# Patient Record
Sex: MALE | Race: OTHER | HISPANIC OR LATINO
[De-identification: names, ages, dates, MRNs, and addresses within clinical notes are randomized per-mention and may not be internally consistent; named-entity substitution may affect disease eponyms.]

---

## 2023-10-24 VITALS
OXYGEN SATURATION: 98 % | SYSTOLIC BLOOD PRESSURE: 144 MMHG | HEART RATE: 48 BPM | TEMPERATURE: 97 F | RESPIRATION RATE: 18 BRPM | DIASTOLIC BLOOD PRESSURE: 65 MMHG

## 2023-10-24 RX ORDER — CHLORHEXIDINE GLUCONATE 213 G/1000ML
1 SOLUTION TOPICAL ONCE
Refills: 0 | Status: DISCONTINUED | OUTPATIENT
Start: 2023-10-26 | End: 2023-10-27

## 2023-10-24 NOTE — H&P ADULT - NSICDXFAMILYHX_GEN_ALL_CORE_FT
FAMILY HISTORY:  Mother  Still living? Unknown  FH: CAD (coronary artery disease), Age at diagnosis: Age Unknown  FH: HTN (hypertension), Age at diagnosis: Age Unknown

## 2023-10-24 NOTE — H&P ADULT - HISTORY OF PRESENT ILLNESS
INCOMPLETE    Cardiologist: Dr. Olvera  Pharmacy:   Escort:     62y/o Greek speaking male, current smoker (started at 14 years old; 2 cigs/ day), FHx CAD/HTN, PMHx HTN, HLD, Prediabetes, Venous Insufficiency, Carotid stenosis, Renal Artery Stenosis, Hx "below neck mass" (s/p surgical removal 2020),  who presents to Dr. Olvera cardiologist reporting occasional Chest Pain at rest (left-sided, "burning," non-radiating) and PROCTOR on 2-3 flights of stairs, associated with easy fatigue, brief palpitations, occasional dizziness, as well as LLE swelling, claudication, and numbness.  Pt underwent TTE (9/29/23) which revealed LVEF 60-65%, normal wall motion, mild LV DD, mild AV sclerosis, trace CO. Pt underwent 10/10/23 Exercise Stress Test which was positive for ischemia with EKG changes of: 1.5mm horizontal ST depressions in II, III, AVF, V4, V5, V6. Per MD note, Holter monitor showed underlying sinus rhythm w/ rare ventricular and supraventricular ectopy. Per MD note, CT Chest/abd/pelvis showed Moderate multivessel coronary calcification.  Pt _______  diaphoresis, orthopnea/PND, cough, LOC, fall, syncope, N/V/D, fever/chills/sick contact, rash, hematuria, BRBPR, melena/hematochezia, PMHx bleeding or clotting disorder.     Per MD note, Carotid duplex showed b/l ICA stenosis (Rt ICA <50%, Lt ICA 50-79% stenosis).  Per MD note, B/L LE Arterial duplex showed no DVTs b/l LE but B/L great saphenous venous incompetence from proximal to distal thigh; PVR normal b/l.    In light of patient's risk factors, abnormal Exercise Stress Test results, and CCS class III-IV anginal/anginal-equivalent symptoms, patient is referred to [facility] for cardiac catheterization w/ possible intervention if clinically indicated.                           Cardiologist: Dr. Olvera  Pharmacy:  [facility] [address]   Escort; Daughter     62y/o Citizen of [place] speaking male, current smoker (started at 14 years old; 2 cigs/ day), FHx CAD/HTN, PMHx HTN, HLD, Prediabetes, Venous Insufficiency, Carotid stenosis, Renal Artery Stenosis, Hx "below neck mass" (s/p surgical removal 2020),  who presents to Dr. Olvera cardiologist reporting occasional Chest Pain at rest (left-sided, "burning," non-radiating) and PROCTOR on 2-3 flights of stairs, associated with easy fatigue, brief palpitations, occasional dizziness, as well as LLE swelling, claudication, and numbness.  Pt underwent TTE (9/29/23) which revealed LVEF 60-65%, normal wall motion, mild LV DD, mild AV sclerosis, trace VT. Pt underwent 10/10/23 Exercise Stress Test which was positive for ischemia with EKG changes of: 1.5mm horizontal ST depressions in II, III, AVF, V4, V5, V6. Per MD note, Holter monitor showed underlying sinus rhythm w/ rare ventricular and supraventricular ectopy. Per MD note, CT Chest/abd/pelvis showed Moderate multivessel coronary calcification.  Pt denies diaphoresis, orthopnea/PND, cough, LOC, fall, syncope, N/V/D, fever/chills/sick contact, rash, hematuria, BRBPR, melena/hematochezia, PMHx bleeding or clotting disorder.     Per MD note, Carotid duplex showed b/l ICA stenosis (Rt ICA <50%, Lt ICA 50-79% stenosis).  Per MD note, B/L LE Arterial duplex showed no DVTs b/l LE but B/L great saphenous venous incompetence from proximal to distal thigh; PVR normal b/l.    In light of patient's risk factors, abnormal Exercise Stress Test results, and CCS class III-IV anginal/anginal-equivalent symptoms, patient is referred to [facility] for cardiac catheterization w/ possible intervention if clinically indicated.                         Cardiologist: Dr. Olvera  Pharmacy:  [facility] [address]   Escort; Daughter     62y/o Bulgarian speaking male, current smoker (started at 14 years old; 2 cigs/ day), FHx CAD/HTN, PMHx HTN, HLD, Prediabetes, Venous Insufficiency, Carotid stenosis, Renal Artery Stenosis, Hx "below neck mass" (s/p surgical removal 2020),  who presents to Dr. Olvera cardiologist reporting occasional Chest Pain at rest (left-sided, "burning," non-radiating) and PROCTOR on 2-3 flights of stairs, associated with easy fatigue, brief palpitations, occasional dizziness, as well as LLE swelling, claudication, and numbness.  Pt underwent TTE (9/29/23) which revealed LVEF 60-65%, normal wall motion, mild LV DD, mild AV sclerosis, trace WI. Pt underwent 10/10/23 Exercise Stress Test which was positive for ischemia with EKG changes of: 1.5mm horizontal ST depressions in II, III, AVF, V4, V5, V6. Per MD note, Holter monitor showed underlying sinus rhythm w/ rare ventricular and supraventricular ectopy. Per MD note, CT Chest/abd/pelvis showed Moderate multivessel coronary calcification.  Pt denies diaphoresis, orthopnea/PND, cough, LOC, fall, syncope, N/V/D, fever/chills/sick contact, rash, hematuria, BRBPR, melena/hematochezia, PMHx bleeding or clotting disorder.     Per MD note, Carotid duplex showed b/l ICA stenosis (Rt ICA <50%, Lt ICA 50-79% stenosis).  Per MD note, B/L LE Arterial duplex showed no DVTs b/l LE but B/L great saphenous venous incompetence from proximal to distal thigh; PVR normal b/l.    In light of patient's risk factors, abnormal Exercise Stress Test results, and CCS class III-IV anginal/anginal-equivalent symptoms, patient is referred to [facility] for cardiac catheterization w/ possible intervention if clinically indicated.

## 2023-10-24 NOTE — H&P ADULT - NSHPLABSRESULTS_GEN_ALL_CORE
13.4   5.00  )-----------( 208      ( 26 Oct 2023 07:51 )             38.5     10-26    141  |  103  |  13  ----------------------------<  126<H>  4.7   |  30  |  1.08    Ca    9.0      26 Oct 2023 07:51  Mg     2.1     10-26    TPro  7.0  /  Alb  4.2  /  TBili  0.2  /  DBili  x   /  AST  19  /  ALT  23  /  AlkPhos  96  10-26

## 2023-10-24 NOTE — H&P ADULT - NSICDXPASTMEDICALHX_GEN_ALL_CORE_FT
PAST MEDICAL HISTORY:  Carotid artery stenosis     History of renal artery stenosis     HLD (hyperlipidemia)     HTN (hypertension)     Pre-diabetes     Smoker     Venous insufficiency

## 2023-10-24 NOTE — H&P ADULT - ASSESSMENT
62y/o [place] Islander speaking male, current smoker (started at 14 years old; 2 cigs/ day), FHx CAD/HTN, PMHx HTN, HLD, Prediabetes, Venous Insufficiency, Carotid stenosis, Renal Artery Stenosis, Hx "below neck mass" (s/p surgical removal 2020),  who presents to Dr. Olvera cardiologist reporting occasional Chest Pain at rest (left-sided, "burning," non-radiating) and PROCTOR on 2-3 flights of stairs, associated with easy fatigue, brief palpitations, occasional dizziness, as well as LLE swelling, claudication, and numbness.  Pt underwent TTE (9/29/23) which revealed LVEF 60-65%, normal wall motion, mild LV DD, mild AV sclerosis, trace MN. Pt underwent 10/10/23 Exercise Stress Test which was positive for ischemia with EKG changes of: 1.5mm horizontal ST depressions in II, III, AVF, V4, V5, V6. Per MD note, Holter monitor showed underlying sinus rhythm w/ rare ventricular and supraventricular ectopy. Per MD note, CT Chest/abd/pelvis showed Moderate multivessel coronary calcification.  Pt denies diaphoresis, orthopnea/PND, cough, LOC, fall, syncope, N/V/D, fever/chills/sick contact, rash, hematuria, BRBPR, melena/hematochezia, PMHx bleeding or clotting disorder.     In light of patient's risk factors, abnormal Exercise Stress Test results, and CCS class III-IV anginal/anginal-equivalent symptoms, patient is referred to [facility] for cardiac catheterization w/ possible intervention if clinically indicated.           Consent obtained via [place] ID [id number]  [place] 2  ASA 2  Pt H+H and platelets WNL, Pt without any reports of BRBPR, hematuria, prior ICH, melena and no bleeding risks. Pt takes home 81mg ASA reports compliance, and 600mg Plavix this morning   Pt Cr. 1. 08 and pre cath fluids ordered per protocol 250ml IV bolus over 30min, and maintenance 75 ml for 2 hours, EF -Normal 60-65  Pt is a Candidate for Moderate Sedation       Risks & benefits of procedure and alternative therapy have been explained to the patient including but not limited to: allergic reaction, bleeding w/possible need for blood transfusion, infection, renal and vascular compromise, limb damage, arrhythmia, stroke, vessel dissection/perforation, Myocardial infarction, emergent CABG. Informed consent obtained and in chart.       Suitable for moderate sedation.

## 2023-10-25 ENCOUNTER — APPOINTMENT (OUTPATIENT)
Dept: CT IMAGING | Facility: HOSPITAL | Age: 63
End: 2023-10-25

## 2023-10-26 ENCOUNTER — TRANSCRIPTION ENCOUNTER (OUTPATIENT)
Age: 63
End: 2023-10-26

## 2023-10-26 ENCOUNTER — INPATIENT (INPATIENT)
Facility: HOSPITAL | Age: 63
LOS: 0 days | Discharge: HOME | DRG: 322 | End: 2023-10-27
Attending: INTERNAL MEDICINE | Admitting: INTERNAL MEDICINE
Payer: MEDICAID

## 2023-10-26 DIAGNOSIS — Q24.5 MALFORMATION OF CORONARY VESSELS: ICD-10-CM

## 2023-10-26 LAB
A1C WITH ESTIMATED AVERAGE GLUCOSE RESULT: 5.7 % — HIGH (ref 4–5.6)
A1C WITH ESTIMATED AVERAGE GLUCOSE RESULT: 5.7 % — HIGH (ref 4–5.6)
ALBUMIN SERPL ELPH-MCNC: 4.2 G/DL — SIGNIFICANT CHANGE UP (ref 3.3–5)
ALBUMIN SERPL ELPH-MCNC: 4.2 G/DL — SIGNIFICANT CHANGE UP (ref 3.3–5)
ALP SERPL-CCNC: 96 U/L — SIGNIFICANT CHANGE UP (ref 40–120)
ALP SERPL-CCNC: 96 U/L — SIGNIFICANT CHANGE UP (ref 40–120)
ALT FLD-CCNC: 23 U/L — SIGNIFICANT CHANGE UP (ref 10–45)
ALT FLD-CCNC: 23 U/L — SIGNIFICANT CHANGE UP (ref 10–45)
ANION GAP SERPL CALC-SCNC: 8 MMOL/L — SIGNIFICANT CHANGE UP (ref 5–17)
ANION GAP SERPL CALC-SCNC: 8 MMOL/L — SIGNIFICANT CHANGE UP (ref 5–17)
APTT BLD: 36.8 SEC — HIGH (ref 24.5–35.6)
APTT BLD: 36.8 SEC — HIGH (ref 24.5–35.6)
AST SERPL-CCNC: 19 U/L — SIGNIFICANT CHANGE UP (ref 10–40)
AST SERPL-CCNC: 19 U/L — SIGNIFICANT CHANGE UP (ref 10–40)
BASOPHILS # BLD AUTO: 0.06 K/UL — SIGNIFICANT CHANGE UP (ref 0–0.2)
BASOPHILS # BLD AUTO: 0.06 K/UL — SIGNIFICANT CHANGE UP (ref 0–0.2)
BASOPHILS NFR BLD AUTO: 1.2 % — SIGNIFICANT CHANGE UP (ref 0–2)
BASOPHILS NFR BLD AUTO: 1.2 % — SIGNIFICANT CHANGE UP (ref 0–2)
BILIRUB SERPL-MCNC: 0.2 MG/DL — SIGNIFICANT CHANGE UP (ref 0.2–1.2)
BILIRUB SERPL-MCNC: 0.2 MG/DL — SIGNIFICANT CHANGE UP (ref 0.2–1.2)
BUN SERPL-MCNC: 13 MG/DL — SIGNIFICANT CHANGE UP (ref 7–23)
BUN SERPL-MCNC: 13 MG/DL — SIGNIFICANT CHANGE UP (ref 7–23)
CALCIUM SERPL-MCNC: 9 MG/DL — SIGNIFICANT CHANGE UP (ref 8.4–10.5)
CALCIUM SERPL-MCNC: 9 MG/DL — SIGNIFICANT CHANGE UP (ref 8.4–10.5)
CHLORIDE SERPL-SCNC: 103 MMOL/L — SIGNIFICANT CHANGE UP (ref 96–108)
CHLORIDE SERPL-SCNC: 103 MMOL/L — SIGNIFICANT CHANGE UP (ref 96–108)
CHOLEST SERPL-MCNC: 166 MG/DL — SIGNIFICANT CHANGE UP
CHOLEST SERPL-MCNC: 166 MG/DL — SIGNIFICANT CHANGE UP
CK MB CFR SERPL CALC: 2.5 NG/ML — SIGNIFICANT CHANGE UP (ref 0–6.7)
CK MB CFR SERPL CALC: 2.5 NG/ML — SIGNIFICANT CHANGE UP (ref 0–6.7)
CK SERPL-CCNC: 123 U/L — SIGNIFICANT CHANGE UP (ref 30–200)
CK SERPL-CCNC: 123 U/L — SIGNIFICANT CHANGE UP (ref 30–200)
CO2 SERPL-SCNC: 30 MMOL/L — SIGNIFICANT CHANGE UP (ref 22–31)
CO2 SERPL-SCNC: 30 MMOL/L — SIGNIFICANT CHANGE UP (ref 22–31)
CREAT SERPL-MCNC: 1.08 MG/DL — SIGNIFICANT CHANGE UP (ref 0.5–1.3)
CREAT SERPL-MCNC: 1.08 MG/DL — SIGNIFICANT CHANGE UP (ref 0.5–1.3)
EGFR: 78 ML/MIN/1.73M2 — SIGNIFICANT CHANGE UP
EGFR: 78 ML/MIN/1.73M2 — SIGNIFICANT CHANGE UP
EOSINOPHIL # BLD AUTO: 0.12 K/UL — SIGNIFICANT CHANGE UP (ref 0–0.5)
EOSINOPHIL # BLD AUTO: 0.12 K/UL — SIGNIFICANT CHANGE UP (ref 0–0.5)
EOSINOPHIL NFR BLD AUTO: 2.4 % — SIGNIFICANT CHANGE UP (ref 0–6)
EOSINOPHIL NFR BLD AUTO: 2.4 % — SIGNIFICANT CHANGE UP (ref 0–6)
ESTIMATED AVERAGE GLUCOSE: 117 MG/DL — HIGH (ref 68–114)
ESTIMATED AVERAGE GLUCOSE: 117 MG/DL — HIGH (ref 68–114)
GLUCOSE SERPL-MCNC: 126 MG/DL — HIGH (ref 70–99)
GLUCOSE SERPL-MCNC: 126 MG/DL — HIGH (ref 70–99)
HCT VFR BLD CALC: 38.5 % — LOW (ref 39–50)
HCT VFR BLD CALC: 38.5 % — LOW (ref 39–50)
HDLC SERPL-MCNC: 40 MG/DL — LOW
HDLC SERPL-MCNC: 40 MG/DL — LOW
HGB BLD-MCNC: 13.4 G/DL — SIGNIFICANT CHANGE UP (ref 13–17)
HGB BLD-MCNC: 13.4 G/DL — SIGNIFICANT CHANGE UP (ref 13–17)
IMM GRANULOCYTES NFR BLD AUTO: 0.2 % — SIGNIFICANT CHANGE UP (ref 0–0.9)
IMM GRANULOCYTES NFR BLD AUTO: 0.2 % — SIGNIFICANT CHANGE UP (ref 0–0.9)
INR BLD: 0.98 — SIGNIFICANT CHANGE UP (ref 0.85–1.18)
INR BLD: 0.98 — SIGNIFICANT CHANGE UP (ref 0.85–1.18)
LIPID PNL WITH DIRECT LDL SERPL: 108 MG/DL — HIGH
LIPID PNL WITH DIRECT LDL SERPL: 108 MG/DL — HIGH
LYMPHOCYTES # BLD AUTO: 1.99 K/UL — SIGNIFICANT CHANGE UP (ref 1–3.3)
LYMPHOCYTES # BLD AUTO: 1.99 K/UL — SIGNIFICANT CHANGE UP (ref 1–3.3)
LYMPHOCYTES # BLD AUTO: 39.8 % — SIGNIFICANT CHANGE UP (ref 13–44)
LYMPHOCYTES # BLD AUTO: 39.8 % — SIGNIFICANT CHANGE UP (ref 13–44)
MAGNESIUM SERPL-MCNC: 2.1 MG/DL — SIGNIFICANT CHANGE UP (ref 1.6–2.6)
MAGNESIUM SERPL-MCNC: 2.1 MG/DL — SIGNIFICANT CHANGE UP (ref 1.6–2.6)
MCHC RBC-ENTMCNC: 32.4 PG — SIGNIFICANT CHANGE UP (ref 27–34)
MCHC RBC-ENTMCNC: 32.4 PG — SIGNIFICANT CHANGE UP (ref 27–34)
MCHC RBC-ENTMCNC: 34.8 GM/DL — SIGNIFICANT CHANGE UP (ref 32–36)
MCHC RBC-ENTMCNC: 34.8 GM/DL — SIGNIFICANT CHANGE UP (ref 32–36)
MCV RBC AUTO: 93 FL — SIGNIFICANT CHANGE UP (ref 80–100)
MCV RBC AUTO: 93 FL — SIGNIFICANT CHANGE UP (ref 80–100)
MONOCYTES # BLD AUTO: 0.47 K/UL — SIGNIFICANT CHANGE UP (ref 0–0.9)
MONOCYTES # BLD AUTO: 0.47 K/UL — SIGNIFICANT CHANGE UP (ref 0–0.9)
MONOCYTES NFR BLD AUTO: 9.4 % — SIGNIFICANT CHANGE UP (ref 2–14)
MONOCYTES NFR BLD AUTO: 9.4 % — SIGNIFICANT CHANGE UP (ref 2–14)
NEUTROPHILS # BLD AUTO: 2.35 K/UL — SIGNIFICANT CHANGE UP (ref 1.8–7.4)
NEUTROPHILS # BLD AUTO: 2.35 K/UL — SIGNIFICANT CHANGE UP (ref 1.8–7.4)
NEUTROPHILS NFR BLD AUTO: 47 % — SIGNIFICANT CHANGE UP (ref 43–77)
NEUTROPHILS NFR BLD AUTO: 47 % — SIGNIFICANT CHANGE UP (ref 43–77)
NON HDL CHOLESTEROL: 126 MG/DL — SIGNIFICANT CHANGE UP
NON HDL CHOLESTEROL: 126 MG/DL — SIGNIFICANT CHANGE UP
NRBC # BLD: 0 /100 WBCS — SIGNIFICANT CHANGE UP (ref 0–0)
NRBC # BLD: 0 /100 WBCS — SIGNIFICANT CHANGE UP (ref 0–0)
PLATELET # BLD AUTO: 208 K/UL — SIGNIFICANT CHANGE UP (ref 150–400)
PLATELET # BLD AUTO: 208 K/UL — SIGNIFICANT CHANGE UP (ref 150–400)
POTASSIUM SERPL-MCNC: 4.7 MMOL/L — SIGNIFICANT CHANGE UP (ref 3.5–5.3)
POTASSIUM SERPL-MCNC: 4.7 MMOL/L — SIGNIFICANT CHANGE UP (ref 3.5–5.3)
POTASSIUM SERPL-SCNC: 4.7 MMOL/L — SIGNIFICANT CHANGE UP (ref 3.5–5.3)
POTASSIUM SERPL-SCNC: 4.7 MMOL/L — SIGNIFICANT CHANGE UP (ref 3.5–5.3)
PROT SERPL-MCNC: 7 G/DL — SIGNIFICANT CHANGE UP (ref 6–8.3)
PROT SERPL-MCNC: 7 G/DL — SIGNIFICANT CHANGE UP (ref 6–8.3)
PROTHROM AB SERPL-ACNC: 11.2 SEC — SIGNIFICANT CHANGE UP (ref 9.5–13)
PROTHROM AB SERPL-ACNC: 11.2 SEC — SIGNIFICANT CHANGE UP (ref 9.5–13)
RBC # BLD: 4.14 M/UL — LOW (ref 4.2–5.8)
RBC # BLD: 4.14 M/UL — LOW (ref 4.2–5.8)
RBC # FLD: 12 % — SIGNIFICANT CHANGE UP (ref 10.3–14.5)
RBC # FLD: 12 % — SIGNIFICANT CHANGE UP (ref 10.3–14.5)
SODIUM SERPL-SCNC: 141 MMOL/L — SIGNIFICANT CHANGE UP (ref 135–145)
SODIUM SERPL-SCNC: 141 MMOL/L — SIGNIFICANT CHANGE UP (ref 135–145)
TRIGL SERPL-MCNC: 89 MG/DL — SIGNIFICANT CHANGE UP
TRIGL SERPL-MCNC: 89 MG/DL — SIGNIFICANT CHANGE UP
WBC # BLD: 5 K/UL — SIGNIFICANT CHANGE UP (ref 3.8–10.5)
WBC # BLD: 5 K/UL — SIGNIFICANT CHANGE UP (ref 3.8–10.5)
WBC # FLD AUTO: 5 K/UL — SIGNIFICANT CHANGE UP (ref 3.8–10.5)
WBC # FLD AUTO: 5 K/UL — SIGNIFICANT CHANGE UP (ref 3.8–10.5)

## 2023-10-26 PROCEDURE — 92928 PRQ TCAT PLMT NTRAC ST 1 LES: CPT | Mod: RC

## 2023-10-26 PROCEDURE — 93458 L HRT ARTERY/VENTRICLE ANGIO: CPT | Mod: 26,59

## 2023-10-26 PROCEDURE — 99152 MOD SED SAME PHYS/QHP 5/>YRS: CPT

## 2023-10-26 RX ORDER — ASPIRIN/CALCIUM CARB/MAGNESIUM 324 MG
81 TABLET ORAL DAILY
Refills: 0 | Status: DISCONTINUED | OUTPATIENT
Start: 2023-10-27 | End: 2023-10-27

## 2023-10-26 RX ORDER — VALSARTAN 80 MG/1
1 TABLET ORAL
Refills: 0 | DISCHARGE

## 2023-10-26 RX ORDER — ASPIRIN/CALCIUM CARB/MAGNESIUM 324 MG
81 TABLET ORAL ONCE
Refills: 0 | Status: DISCONTINUED | OUTPATIENT
Start: 2023-10-26 | End: 2023-10-26

## 2023-10-26 RX ORDER — SODIUM CHLORIDE 9 MG/ML
250 INJECTION INTRAMUSCULAR; INTRAVENOUS; SUBCUTANEOUS ONCE
Refills: 0 | Status: COMPLETED | OUTPATIENT
Start: 2023-10-26 | End: 2023-10-26

## 2023-10-26 RX ORDER — CLOPIDOGREL BISULFATE 75 MG/1
75 TABLET, FILM COATED ORAL DAILY
Refills: 0 | Status: DISCONTINUED | OUTPATIENT
Start: 2023-10-27 | End: 2023-10-27

## 2023-10-26 RX ORDER — SODIUM CHLORIDE 9 MG/ML
500 INJECTION INTRAMUSCULAR; INTRAVENOUS; SUBCUTANEOUS
Refills: 0 | Status: DISCONTINUED | OUTPATIENT
Start: 2023-10-26 | End: 2023-10-27

## 2023-10-26 RX ORDER — SODIUM CHLORIDE 9 MG/ML
500 INJECTION INTRAMUSCULAR; INTRAVENOUS; SUBCUTANEOUS
Refills: 0 | Status: DISCONTINUED | OUTPATIENT
Start: 2023-10-26 | End: 2023-10-26

## 2023-10-26 RX ORDER — NICOTINE POLACRILEX 2 MG
1 GUM BUCCAL
Refills: 0 | DISCHARGE

## 2023-10-26 RX ORDER — CLOPIDOGREL BISULFATE 75 MG/1
600 TABLET, FILM COATED ORAL ONCE
Refills: 0 | Status: COMPLETED | OUTPATIENT
Start: 2023-10-26 | End: 2023-10-26

## 2023-10-26 RX ORDER — ATORVASTATIN CALCIUM 80 MG/1
40 TABLET, FILM COATED ORAL AT BEDTIME
Refills: 0 | Status: DISCONTINUED | OUTPATIENT
Start: 2023-10-26 | End: 2023-10-27

## 2023-10-26 RX ORDER — METOPROLOL TARTRATE 50 MG
25 TABLET ORAL DAILY
Refills: 0 | Status: DISCONTINUED | OUTPATIENT
Start: 2023-10-27 | End: 2023-10-27

## 2023-10-26 RX ADMIN — SODIUM CHLORIDE 75 MILLILITER(S): 9 INJECTION INTRAMUSCULAR; INTRAVENOUS; SUBCUTANEOUS at 09:19

## 2023-10-26 RX ADMIN — SODIUM CHLORIDE 240 MILLILITER(S): 9 INJECTION INTRAMUSCULAR; INTRAVENOUS; SUBCUTANEOUS at 13:52

## 2023-10-26 RX ADMIN — ATORVASTATIN CALCIUM 40 MILLIGRAM(S): 80 TABLET, FILM COATED ORAL at 21:40

## 2023-10-26 RX ADMIN — SODIUM CHLORIDE 500 MILLILITER(S): 9 INJECTION INTRAMUSCULAR; INTRAVENOUS; SUBCUTANEOUS at 09:19

## 2023-10-26 RX ADMIN — CLOPIDOGREL BISULFATE 600 MILLIGRAM(S): 75 TABLET, FILM COATED ORAL at 10:57

## 2023-10-26 NOTE — DISCHARGE NOTE PROVIDER - NSDCMRMEDTOKEN_GEN_ALL_CORE_FT
Aspirin Enteric Coated 81 mg oral delayed release tablet: 1 tab(s) orally once a day  atorvastatin 20 mg oral tablet: 1 tab(s) orally once a day (at bedtime)  hydroCHLOROthiazide 25 mg oral tablet: 1 tab(s) orally once a day  metoprolol succinate 25 mg oral tablet, extended release: 1 tab(s) orally once a day  Vitamin B12 with Folic Acid sublingual tablet: 1 tab(s) sublingually once a day   Aspirin Enteric Coated 81 mg oral delayed release tablet: 1 tab(s) orally once a day  Cardiac Rehab: Please refer patient for cardiac rehab: 3 times per week for 12 weeks duration. Cardiologist: Dr. Olvera  hydroCHLOROthiazide 25 mg oral tablet: 1 tab(s) orally once a day  Lipitor 40 mg oral tablet: 1 tab(s) orally once a day (at bedtime)  Metoprolol Succinate ER 25 mg oral tablet, extended release: 0.5 tab(s) orally once a day  Plavix 75 mg oral tablet: 1 tab(s) orally once a day  Vitamin B12 with Folic Acid sublingual tablet: 1 tab(s) sublingually once a day

## 2023-10-26 NOTE — DISCHARGE NOTE PROVIDER - HOSPITAL COURSE
64y/o Taiwanese speaking male, current smoker (started at 14 years old; 2 cigs/ day), FHx CAD/HTN, PMHx HTN, HLD, Prediabetes, Venous Insufficiency, Carotid stenosis, Renal Artery Stenosis, Hx "below neck mass" (s/p surgical removal 2020),  who presents to Dr. Olvera cardiologist reporting occasional Chest Pain at rest (left-sided, "burning," non-radiating) and PROCTOR on 2-3 flights of stairs, associated with easy fatigue, brief palpitations, occasional dizziness, as well as LLE swelling, claudication, and numbness.  Pt underwent TTE (9/29/23) which revealed LVEF 60-65%, normal wall motion, mild LV DD, mild AV sclerosis, trace MS. Pt underwent 10/10/23 Exercise Stress Test which was positive for ischemia with EKG changes of: 1.5mm horizontal ST depressions in II, III, AVF, V4, V5, V6. Per MD note, Holter monitor showed underlying sinus rhythm w/ rare ventricular and supraventricular ectopy. Per MD note, CT Chest/abd/pelvis showed Moderate multivessel coronary calcification.  Pt denies diaphoresis, orthopnea/PND, cough, LOC, fall, syncope, N/V/D, fever/chills/sick contact, rash, hematuria, BRBPR, melena/hematochezia, PMHx bleeding or clotting disorder. Per MD note, Carotid duplex showed b/l ICA stenosis (Rt ICA <50%, Lt ICA 50-79% stenosis).  Per MD note, B/L LE Arterial duplex showed no DVTs b/l LE but B/L great saphenous venous incompetence from proximal to distal thigh; PVR normal b/l. In light of patient's risk factors, abnormal Exercise Stress Test results, and CCS class III-IV anginal/anginal-equivalent symptoms, patient is referred to [facility] for cardiac catheterization w/ possible intervention if clinically indicated.                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 Pt admitted overnight for observation and telemetry monitoring. Pt seen and examined at bedside this AM without any complaints or events overnight, VSS, labs and telemetry reviewed and pt stable for discharge as discussed with Dr. Yañez. Pt has received appropriate discharge instructions, including medication regimen, access site management and follow up with Dr. Olvera in 1-2 weeks.    Discharge medications: ASA 81mg QD, Plavix 75mg QD, Lipitor 40 mg QHS, HCTZ 25 mg QD, Vitamin B12 with folate    Cardiac Rehab (Post PCI): Education on benefits of Cardiac Rehab provided to patient: Yes, Referral and Prescription Given for Cardiac Rehab: Yes, Pt given list of locations & instructed to contact their insurance company to review list of participating providers. 64y/o Citizen of [place] speaking male, current smoker (started at 14 years old; 2 cigs/ day), FHx CAD/HTN, PMHx HTN, HLD, Prediabetes, Venous Insufficiency, Carotid stenosis, Renal Artery Stenosis, Hx "below neck mass" (s/p surgical removal 2020),  who presents to Dr. Olvera cardiologist reporting occasional Chest Pain at rest (left-sided, "burning," non-radiating) and PROCTOR on 2-3 flights of stairs, associated with easy fatigue, brief palpitations, occasional dizziness, as well as LLE swelling, claudication, and numbness.  Pt underwent TTE (9/29/23) which revealed LVEF 60-65%, normal wall motion, mild LV DD, mild AV sclerosis, trace [place]. Pt underwent 10/10/23 Exercise Stress Test which was positive for ischemia with EKG changes of: 1.5mm horizontal ST depressions in II, III, AVF, V4, V5, V6. Per MD note, Holter monitor showed underlying sinus rhythm w/ rare ventricular and supraventricular ectopy. Per MD note, CT Chest/abd/pelvis showed Moderate multivessel coronary calcification.  Pt denies diaphoresis, orthopnea/PND, cough, LOC, fall, syncope, N/V/D, fever/chills/sick contact, rash, hematuria, BRBPR, melena/hematochezia, PMHx bleeding or clotting disorder. Per MD note, Carotid duplex showed b/l ICA stenosis (Rt ICA <50%, Lt ICA 50-79% stenosis).  Per MD note, B/L LE Arterial duplex showed no DVTs b/l LE but B/L great saphenous venous incompetence from proximal to distal thigh; PVR normal b/l. In light of patient's risk factors, abnormal Exercise Stress Test results, and CCS class III-IV anginal/anginal-equivalent symptoms, patient is referred to [facility] for cardiac catheterization w/ possible intervention if clinically indicated.                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 Pt admitted overnight for observation and telemetry monitoring. Pt seen and examined at bedside this AM without any complaints or events overnight, VSS, labs and telemetry reviewed and pt stable for discharge as discussed with Dr. Yañez. Pt has received appropriate discharge instructions, including medication regimen, access site management and follow up with Dr. Olvera in 1-2 weeks.    Discharge medications: ASA 81mg QD, Plavix 75mg QD, Lipitor 40 mg QHS, HCTZ 25 mg QD, Toprol XL 12.5 mg QD, Vitamin B12 with folate    Cardiac Rehab (Post PCI): Education on benefits of Cardiac Rehab provided to patient: Yes, Referral and Prescription Given for Cardiac Rehab: Yes, Pt given list of locations & instructed to contact their insurance company to review list of participating providers.

## 2023-10-26 NOTE — DISCHARGE NOTE PROVIDER - NSDCCPCAREPLAN_GEN_ALL_CORE_FT
PRINCIPAL DISCHARGE DIAGNOSIS  Diagnosis: CAD (coronary artery disease)  Assessment and Plan of Treatment: You were found to have blockages in the arteries of your heart, also known as Coronary Artery Disease. You underwent a cardiac catheterization on 10/26/2023 and received a stent to the right coronary artery.  PLEASE CONTINUE ASPIRIN 81MG DAILY AND PLAVIX 75MG DAILY. DO NOT STOP THESE MEDICATIONS FOR ANY REASON AS THEY ARE KEEPING YOUR STENT OPEN AND PREVENTING A HEART ATTACK.   Avoid strenuous activity or heavy lifting anything more than 5lbs for the next five days. Do not take a bath or swim for the next five days; you may shower. For any bleeding or hematoma formation (hardened blood collection under the skin) at the access site of your wrist please hold pressure and go to the emergency room. Please follow up with Dr. Olvera in 1-2 weeks. For recurrent chest pain, please call your doctor or go to the emergency room.      SECONDARY DISCHARGE DIAGNOSES  Diagnosis: HTN (hypertension)  Assessment and Plan of Treatment: Please continue metoprolol succinate 25 mg daily and hydrochlorothiazide 25 mg daily as listed to keep your blood pressure controlled. For blood pressure that is too high or too low please see your doctor or go to the emergency room as necessary.    Diagnosis: HLD (hyperlipidemia)  Assessment and Plan of Treatment: Please continue atorvastatin 40 mg at bedtime to keep your cholesterol low. High cholesterol contributes to heart disease.     PRINCIPAL DISCHARGE DIAGNOSIS  Diagnosis: CAD (coronary artery disease)  Assessment and Plan of Treatment: You were found to have blockages in the arteries of your heart, also known as Coronary Artery Disease. You underwent a cardiac catheterization on 10/26/2023 and received a stent to the right coronary artery.  PLEASE CONTINUE ASPIRIN 81MG DAILY AND PLAVIX 75MG DAILY. DO NOT STOP THESE MEDICATIONS FOR ANY REASON AS THEY ARE KEEPING YOUR STENT OPEN AND PREVENTING A HEART ATTACK.   Avoid strenuous activity or heavy lifting anything more than 5lbs for the next five days. Do not take a bath or swim for the next five days; you may shower. For any bleeding or hematoma formation (hardened blood collection under the skin) at the access site of your wrist please hold pressure and go to the emergency room. Please follow up with Dr. Olvera in 1-2 weeks. For recurrent chest pain, please call your doctor or go to the emergency room.  We have provided you with a prescription for cardiac rehab which is medically supervised exercise program for your heart and has been shown to improve the quantity and quality of life of people with heart disease like yours. You should attend cardiac rehab 3 times per week for 12 weeks. We have provided you with a list of nearby facilities. Please call your insurance carrier to determine which of these facilities are covered under your plan. Please bring this prescription with you to your follow up appointment with your cardiologist who can then further assist you to enroll into a cardiac rehab program.      SECONDARY DISCHARGE DIAGNOSES  Diagnosis: HTN (hypertension)  Assessment and Plan of Treatment: Please continue metoprolol succinate 12.5 mg daily (THIS DOSE WAS REDUCED BECAUSE YOUR HEART RATE WAS SLIGHTLY LOW DURING YOUR HOSPITALIZATION) and hydrochlorothiazide 25 mg daily as listed to keep your blood pressure controlled. For blood pressure that is too high or too low please see your doctor or go to the emergency room as necessary.    Diagnosis: HLD (hyperlipidemia)  Assessment and Plan of Treatment: Please START atorvastatin 40 mg at bedtime (an increased dose) to keep your cholesterol low. High cholesterol contributes to heart disease.

## 2023-10-26 NOTE — PATIENT PROFILE ADULT - FALL HARM RISK - HARM RISK INTERVENTIONS

## 2023-10-26 NOTE — PATIENT PROFILE ADULT - COMPLETE THE FOLLOWING IF THE PATIENT REFUSES THE INFLUENZA VACCINE:
Patient: Wei Wolf    Procedure(s):  Right Index Finger Metacarpal Bone Cyst Curettage with Allograft from Distal Radius - Wound Class: I-Clean   - Wound Class: I-Clean    Diagnosis:Right Index Finger Metacarpal Bone Cyst  Diagnosis Additional Information: No value filed.    Anesthesia Type:  MAC, Peripheral Nerve Block, Periph. Nerve Block for postop pain    Note:  Anesthesia Post Evaluation    Patient location during evaluation: bedside  Patient participation: Able to participate in evaluation but full recovery from regional anesthesia has not yet ocurrred but is anticipated to occur within 48 hours  Level of consciousness: awake  Pain management: adequate  Airway patency: patent  Cardiovascular status: acceptable  Respiratory status: acceptable  Hydration status: acceptable  PONV: controlled     Anesthetic complications: None          Last vitals:  Vitals:    12/21/17 1530 12/21/17 1723 12/21/17 1738   BP: 132/70 118/76 132/68   Pulse: 78     Resp: 20 16 16   Temp:  35.9  C (96.6  F)    SpO2: 93% 94% 94%         Electronically Signed By: Mitch Herman MD, MD  December 21, 2017  5:49 PM  
Risks/benefits discussed with patient/surrogate

## 2023-10-27 ENCOUNTER — TRANSCRIPTION ENCOUNTER (OUTPATIENT)
Age: 63
End: 2023-10-27

## 2023-10-27 VITALS — HEART RATE: 60 BPM | RESPIRATION RATE: 18 BRPM | DIASTOLIC BLOOD PRESSURE: 78 MMHG | SYSTOLIC BLOOD PRESSURE: 161 MMHG

## 2023-10-27 LAB
ANION GAP SERPL CALC-SCNC: 8 MMOL/L — SIGNIFICANT CHANGE UP (ref 5–17)
ANION GAP SERPL CALC-SCNC: 8 MMOL/L — SIGNIFICANT CHANGE UP (ref 5–17)
BUN SERPL-MCNC: 13 MG/DL — SIGNIFICANT CHANGE UP (ref 7–23)
BUN SERPL-MCNC: 13 MG/DL — SIGNIFICANT CHANGE UP (ref 7–23)
CALCIUM SERPL-MCNC: 9.2 MG/DL — SIGNIFICANT CHANGE UP (ref 8.4–10.5)
CALCIUM SERPL-MCNC: 9.2 MG/DL — SIGNIFICANT CHANGE UP (ref 8.4–10.5)
CHLORIDE SERPL-SCNC: 102 MMOL/L — SIGNIFICANT CHANGE UP (ref 96–108)
CHLORIDE SERPL-SCNC: 102 MMOL/L — SIGNIFICANT CHANGE UP (ref 96–108)
CO2 SERPL-SCNC: 28 MMOL/L — SIGNIFICANT CHANGE UP (ref 22–31)
CO2 SERPL-SCNC: 28 MMOL/L — SIGNIFICANT CHANGE UP (ref 22–31)
CREAT SERPL-MCNC: 0.91 MG/DL — SIGNIFICANT CHANGE UP (ref 0.5–1.3)
CREAT SERPL-MCNC: 0.91 MG/DL — SIGNIFICANT CHANGE UP (ref 0.5–1.3)
EGFR: 96 ML/MIN/1.73M2 — SIGNIFICANT CHANGE UP
EGFR: 96 ML/MIN/1.73M2 — SIGNIFICANT CHANGE UP
GLUCOSE SERPL-MCNC: 133 MG/DL — HIGH (ref 70–99)
GLUCOSE SERPL-MCNC: 133 MG/DL — HIGH (ref 70–99)
HCT VFR BLD CALC: 37.8 % — LOW (ref 39–50)
HCT VFR BLD CALC: 37.8 % — LOW (ref 39–50)
HCV AB S/CO SERPL IA: 0.04 S/CO — SIGNIFICANT CHANGE UP
HCV AB S/CO SERPL IA: 0.04 S/CO — SIGNIFICANT CHANGE UP
HCV AB SERPL-IMP: SIGNIFICANT CHANGE UP
HCV AB SERPL-IMP: SIGNIFICANT CHANGE UP
HGB BLD-MCNC: 13.3 G/DL — SIGNIFICANT CHANGE UP (ref 13–17)
HGB BLD-MCNC: 13.3 G/DL — SIGNIFICANT CHANGE UP (ref 13–17)
MAGNESIUM SERPL-MCNC: 2.1 MG/DL — SIGNIFICANT CHANGE UP (ref 1.6–2.6)
MAGNESIUM SERPL-MCNC: 2.1 MG/DL — SIGNIFICANT CHANGE UP (ref 1.6–2.6)
MCHC RBC-ENTMCNC: 32.3 PG — SIGNIFICANT CHANGE UP (ref 27–34)
MCHC RBC-ENTMCNC: 32.3 PG — SIGNIFICANT CHANGE UP (ref 27–34)
MCHC RBC-ENTMCNC: 35.2 GM/DL — SIGNIFICANT CHANGE UP (ref 32–36)
MCHC RBC-ENTMCNC: 35.2 GM/DL — SIGNIFICANT CHANGE UP (ref 32–36)
MCV RBC AUTO: 91.7 FL — SIGNIFICANT CHANGE UP (ref 80–100)
MCV RBC AUTO: 91.7 FL — SIGNIFICANT CHANGE UP (ref 80–100)
NRBC # BLD: 0 /100 WBCS — SIGNIFICANT CHANGE UP (ref 0–0)
NRBC # BLD: 0 /100 WBCS — SIGNIFICANT CHANGE UP (ref 0–0)
PLATELET # BLD AUTO: 211 K/UL — SIGNIFICANT CHANGE UP (ref 150–400)
PLATELET # BLD AUTO: 211 K/UL — SIGNIFICANT CHANGE UP (ref 150–400)
POTASSIUM SERPL-MCNC: 3.9 MMOL/L — SIGNIFICANT CHANGE UP (ref 3.5–5.3)
POTASSIUM SERPL-MCNC: 3.9 MMOL/L — SIGNIFICANT CHANGE UP (ref 3.5–5.3)
POTASSIUM SERPL-SCNC: 3.9 MMOL/L — SIGNIFICANT CHANGE UP (ref 3.5–5.3)
POTASSIUM SERPL-SCNC: 3.9 MMOL/L — SIGNIFICANT CHANGE UP (ref 3.5–5.3)
RBC # BLD: 4.12 M/UL — LOW (ref 4.2–5.8)
RBC # BLD: 4.12 M/UL — LOW (ref 4.2–5.8)
RBC # FLD: 11.9 % — SIGNIFICANT CHANGE UP (ref 10.3–14.5)
RBC # FLD: 11.9 % — SIGNIFICANT CHANGE UP (ref 10.3–14.5)
SODIUM SERPL-SCNC: 138 MMOL/L — SIGNIFICANT CHANGE UP (ref 135–145)
SODIUM SERPL-SCNC: 138 MMOL/L — SIGNIFICANT CHANGE UP (ref 135–145)
WBC # BLD: 6.02 K/UL — SIGNIFICANT CHANGE UP (ref 3.8–10.5)
WBC # BLD: 6.02 K/UL — SIGNIFICANT CHANGE UP (ref 3.8–10.5)
WBC # FLD AUTO: 6.02 K/UL — SIGNIFICANT CHANGE UP (ref 3.8–10.5)
WBC # FLD AUTO: 6.02 K/UL — SIGNIFICANT CHANGE UP (ref 3.8–10.5)

## 2023-10-27 PROCEDURE — 80053 COMPREHEN METABOLIC PANEL: CPT

## 2023-10-27 PROCEDURE — C1725: CPT

## 2023-10-27 PROCEDURE — C1887: CPT

## 2023-10-27 PROCEDURE — 36415 COLL VENOUS BLD VENIPUNCTURE: CPT

## 2023-10-27 PROCEDURE — 85027 COMPLETE CBC AUTOMATED: CPT

## 2023-10-27 PROCEDURE — C1894: CPT

## 2023-10-27 PROCEDURE — 85730 THROMBOPLASTIN TIME PARTIAL: CPT

## 2023-10-27 PROCEDURE — 93010 ELECTROCARDIOGRAM REPORT: CPT

## 2023-10-27 PROCEDURE — 85347 COAGULATION TIME ACTIVATED: CPT

## 2023-10-27 PROCEDURE — C1769: CPT

## 2023-10-27 PROCEDURE — 93005 ELECTROCARDIOGRAM TRACING: CPT

## 2023-10-27 PROCEDURE — 83735 ASSAY OF MAGNESIUM: CPT

## 2023-10-27 PROCEDURE — C1874: CPT

## 2023-10-27 PROCEDURE — 86803 HEPATITIS C AB TEST: CPT

## 2023-10-27 PROCEDURE — 80061 LIPID PANEL: CPT

## 2023-10-27 PROCEDURE — 83036 HEMOGLOBIN GLYCOSYLATED A1C: CPT

## 2023-10-27 PROCEDURE — 99239 HOSP IP/OBS DSCHRG MGMT >30: CPT

## 2023-10-27 PROCEDURE — 85025 COMPLETE CBC W/AUTO DIFF WBC: CPT

## 2023-10-27 PROCEDURE — 82553 CREATINE MB FRACTION: CPT

## 2023-10-27 PROCEDURE — 80048 BASIC METABOLIC PNL TOTAL CA: CPT

## 2023-10-27 PROCEDURE — 82550 ASSAY OF CK (CPK): CPT

## 2023-10-27 PROCEDURE — 85610 PROTHROMBIN TIME: CPT

## 2023-10-27 RX ORDER — ASPIRIN/CALCIUM CARB/MAGNESIUM 324 MG
1 TABLET ORAL
Qty: 30 | Refills: 11
Start: 2023-10-27 | End: 2024-10-20

## 2023-10-27 RX ORDER — METOPROLOL TARTRATE 50 MG
1 TABLET ORAL
Refills: 0 | DISCHARGE

## 2023-10-27 RX ORDER — POTASSIUM CHLORIDE 20 MEQ
10 PACKET (EA) ORAL ONCE
Refills: 0 | Status: COMPLETED | OUTPATIENT
Start: 2023-10-27 | End: 2023-10-27

## 2023-10-27 RX ORDER — ATORVASTATIN CALCIUM 80 MG/1
1 TABLET, FILM COATED ORAL
Refills: 0 | DISCHARGE

## 2023-10-27 RX ORDER — ASPIRIN/CALCIUM CARB/MAGNESIUM 324 MG
1 TABLET ORAL
Refills: 0 | DISCHARGE

## 2023-10-27 RX ORDER — CLOPIDOGREL BISULFATE 75 MG/1
1 TABLET, FILM COATED ORAL
Qty: 30 | Refills: 11
Start: 2023-10-27 | End: 2024-10-20

## 2023-10-27 RX ORDER — ATORVASTATIN CALCIUM 80 MG/1
1 TABLET, FILM COATED ORAL
Qty: 30 | Refills: 2
Start: 2023-10-27 | End: 2024-01-24

## 2023-10-27 RX ORDER — METOPROLOL TARTRATE 50 MG
0.5 TABLET ORAL
Qty: 15 | Refills: 2
Start: 2023-10-27 | End: 2024-01-24

## 2023-10-27 RX ADMIN — Medication 25 MILLIGRAM(S): at 07:19

## 2023-10-27 RX ADMIN — CLOPIDOGREL BISULFATE 75 MILLIGRAM(S): 75 TABLET, FILM COATED ORAL at 13:15

## 2023-10-27 RX ADMIN — Medication 81 MILLIGRAM(S): at 13:16

## 2023-10-27 RX ADMIN — Medication 10 MILLIEQUIVALENT(S): at 13:15

## 2023-10-27 NOTE — DISCHARGE NOTE NURSING/CASE MANAGEMENT/SOCIAL WORK - PATIENT PORTAL LINK FT
You can access the FollowMyHealth Patient Portal offered by [facility] by registering at the following website: [email or web address]. By joining [employer]’s FollowMyHealth portal, you will also be able to view your health information using other applications (apps) compatible with our system.

## 2023-10-27 NOTE — DISCHARGE NOTE NURSING/CASE MANAGEMENT/SOCIAL WORK - NSTRANSFERBELONGINGSRESP_GEN_A_NUR
----- Message from Morelia Kyle sent at 1/12/2018  2:09 PM CST -----  Contact: Patient Portal  Appointment Request From: Alan Fairbanks Jr.    With Provider: Jannette Tuttle DNP, NP [[facility] - Endocrinology]    Would Accept With:Only the person I've selected    Preferred Date Range: From 1/15/2018 To 1/31/2018    Preferred Times: Any    Reason for visit: Request an Appt    Comments:  I was unable to keep my appointment with Jannette a few months ago due to three hospital stays and chemo.  I really need to get with her and discuss my sugar levels. I feel my insulin needs to be adjusted.  I appreciate any help you can give me setting up an appointment.  There are two days within the above dates I won't be able to see her, the 26th and 29th.  Thanks for your help with this matter.  Alan Fairbanks Jr.    Thank You  
Spoke to patient and let him know that DURGA Tuttle NP will be leaving on Jan 26th and that we could schedule him with on of our NP or Chirag. We schedule him with Dr. Catherine.   
yes

## 2023-11-03 DIAGNOSIS — I87.8 OTHER SPECIFIED DISORDERS OF VEINS: ICD-10-CM

## 2023-11-03 DIAGNOSIS — I10 ESSENTIAL (PRIMARY) HYPERTENSION: ICD-10-CM

## 2023-11-03 DIAGNOSIS — R73.03 PREDIABETES: ICD-10-CM

## 2023-11-03 DIAGNOSIS — I70.212 ATHEROSCLEROSIS OF NATIVE ARTERIES OF EXTREMITIES WITH INTERMITTENT CLAUDICATION, LEFT LEG: ICD-10-CM

## 2023-11-03 DIAGNOSIS — Z79.82 LONG TERM (CURRENT) USE OF ASPIRIN: ICD-10-CM

## 2023-11-03 DIAGNOSIS — I70.1 ATHEROSCLEROSIS OF RENAL ARTERY: ICD-10-CM

## 2023-11-03 DIAGNOSIS — E78.5 HYPERLIPIDEMIA, UNSPECIFIED: ICD-10-CM

## 2023-11-03 DIAGNOSIS — I25.10 ATHEROSCLEROTIC HEART DISEASE OF NATIVE CORONARY ARTERY WITHOUT ANGINA PECTORIS: ICD-10-CM

## 2023-11-03 DIAGNOSIS — I65.29 OCCLUSION AND STENOSIS OF UNSPECIFIED CAROTID ARTERY: ICD-10-CM

## 2023-11-03 DIAGNOSIS — F17.210 NICOTINE DEPENDENCE, CIGARETTES, UNCOMPLICATED: ICD-10-CM

## 2023-11-03 LAB
ISTAT ACTK (ACTIVATED CLOTTING TIME KAOLIN): 234 SEC — HIGH (ref 74–137)
ISTAT ACTK (ACTIVATED CLOTTING TIME KAOLIN): 234 SEC — HIGH (ref 74–137)

## 2024-05-16 PROBLEM — I10 ESSENTIAL (PRIMARY) HYPERTENSION: Chronic | Status: ACTIVE | Noted: 2023-10-24

## 2024-05-16 PROBLEM — E78.5 HYPERLIPIDEMIA, UNSPECIFIED: Chronic | Status: ACTIVE | Noted: 2023-10-24

## 2024-05-16 PROBLEM — R73.03 PREDIABETES: Chronic | Status: ACTIVE | Noted: 2023-10-24

## 2024-05-16 PROBLEM — I87.2 VENOUS INSUFFICIENCY (CHRONIC) (PERIPHERAL): Chronic | Status: ACTIVE | Noted: 2023-10-24

## 2024-05-16 PROBLEM — I65.29 OCCLUSION AND STENOSIS OF UNSPECIFIED CAROTID ARTERY: Chronic | Status: ACTIVE | Noted: 2023-10-24

## 2024-05-20 VITALS
HEART RATE: 56 BPM | HEIGHT: 64 IN | OXYGEN SATURATION: 95 % | WEIGHT: 182.1 LBS | DIASTOLIC BLOOD PRESSURE: 67 MMHG | SYSTOLIC BLOOD PRESSURE: 134 MMHG | TEMPERATURE: 98 F | RESPIRATION RATE: 17 BRPM

## 2024-05-20 RX ORDER — CHLORHEXIDINE GLUCONATE 213 G/1000ML
1 SOLUTION TOPICAL ONCE
Refills: 0 | Status: DISCONTINUED | OUTPATIENT
Start: 2024-05-23 | End: 2024-06-07

## 2024-05-20 RX ORDER — SODIUM CHLORIDE 9 MG/ML
1000 INJECTION INTRAMUSCULAR; INTRAVENOUS; SUBCUTANEOUS
Refills: 0 | Status: DISCONTINUED | OUTPATIENT
Start: 2024-05-23 | End: 2024-05-23

## 2024-05-20 NOTE — H&P ADULT - NSICDXPASTMEDICALHX_GEN_ALL_CORE_FT
PAST MEDICAL HISTORY:  CAD (coronary artery disease)     Carotid artery stenosis     HLD (hyperlipidemia)     HTN (hypertension)     Pre-diabetes     Venous insufficiency

## 2024-05-20 NOTE — H&P ADULT - NSHPLABSRESULTS_GEN_ALL_CORE
13.5   4.69  )-----------( 202      ( 23 May 2024 12:58 )             39.7       05-23    141  |  106  |  14  ----------------------------<  104<H>  4.5   |  26  |  1.14    Ca    9.1      23 May 2024 12:58  Mg     2.1     05-23    TPro  6.4  /  Alb  4.1  /  TBili  0.3  /  DBili  x   /  AST  18  /  ALT  20  /  AlkPhos  89  05-23      PT/INR - ( 23 May 2024 12:58 )   PT: 12.4 sec;   INR: 1.09          PTT - ( 23 May 2024 12:58 )  PTT:36.9 sec    CARDIAC MARKERS ( 23 May 2024 12:58 )  x     / x     / 131 U/L / x     / 2.9 ng/mL        Urinalysis Basic - ( 23 May 2024 12:58 )    Color: x / Appearance: x / SG: x / pH: x  Gluc: 104 mg/dL / Ketone: x  / Bili: x / Urobili: x   Blood: x / Protein: x / Nitrite: x   Leuk Esterase: x / RBC: x / WBC x   Sq Epi: x / Non Sq Epi: x / Bacteria: x        EKG: NSR @ 61 bpm, isolated TWI lead III

## 2024-05-20 NOTE — H&P ADULT - ASSESSMENT
64M with hx of HTN, HLD, CAD s/p ALLI to anomalous RCA (10/26/23), FLORENTINO (LICA 50-79% per MD note), pre-DM, and PVD, who now presents to [facility] for cardiac catheterization and revascularization if clinically indicated, in light of pt risk factors and CCS class III anginal symptoms.    Past Testing:  - Stress Test (3/25/24): negative for ischemia  - C (10/26/23): ALLI to anomalous RCA from left cusp (80%) -- via RRA; Other findings: LM normal, mLAD 30%, ostial D1 60-70%, LCx / OM1 / RPDA mild diffuse disease -- via RRA [IC: Dr. SPENCER Olvera / Fellow: Alonzo Nino]  - As per discharge note (10/26/23): EF 60-65% by ECHO    ASA III          Mallampati II  Patient is a candidate for sedation: yes  Sedation: Moderate    - Load: No load given, pt reports DAPT compliance - last dose today AM.  - IV hydration:  cc bolus x1, followed by NS 75 cc/hr x 2 hours as per hydration protocol.  - Informed consent obtained and placed in chart using SpeechTrans (Jose [id number]). Pt agrees to blood transfusion in case of emergency (discussed again using Language Backblaze Pepe [id number]).    Risks & benefits of procedure and alternative therapy have been explained to the patient including but not limited to: allergic reaction, bleeding w/possible need for blood transfusion, infection, renal and vascular compromise, limb damage, arrhythmia, stroke, vessel dissection/perforation, Myocardial infarction, emergent CABG.

## 2024-05-20 NOTE — H&P ADULT - EYES
10th Visit Progress Note     01/19/24 0500   Appointment Info   Treating Provider Belkis Mcconnell MS, CCC-SLP   Visits Used 10/10; MA   Medical Diagnosis speech delay, trisomy 21   SLP Tx Diagnosis severe expressive language deficits;severe receptive language deficits;   Other pertinent information AAC device - TD Snap;  stays in lobby with father.   Quick Adds Certification   Session Number   Session Number 51   Authorization status [employer] PMAP   Progress Note/Certification   Start Of Care Date 04/13/22   Onset Of Illness/injury Or Date Of Surgery 04/13/22   Therapy Frequency 1x/week   Predicted Duration 90 days   Certification date from 12/01/23   Certification date to 02/27/24   KX Modifier Statement I certify the need for these services furnished under this plan of treatment and while under my care.  (Physician co-signature of this document indicates review and certification of the therapy plan)   Progress Note Due Date 02/27/24   Progress Note Completed Date 12/01/23   Recertification Due 02/27/24   Recertification Complete 12/01/23       Present No    ID or First/Last Name Denied service this date   Preferred Language Macedonian   Subjective Report   Subjective Report Arrived on time with dad -- bright & willing throughout; attended session alva; dad had nothing new to report   SLP Goals   SLP Goals 1;2;3;4   SLP Goal 1   Goal Identifier Speech   Goal Description Marcos will imitate early developing speech sounds (/m, p, b , h, w, n, t, d, k, g, f/) in CV, VC, and CVC syllables in 80% trials when provided with direct models and moderate cues to improve intelligibility.   Rationale To maximize functional communication within the home or community;To maximize the ability to communicate wants and needs within the home or community   Goal Progress 12/22 Functionally targeted speech sounds via imitation of direct models for core words requesting 10x (more, want, me) 12/1 0x  "attempt production while  targeting bilabials during game; limited participation   Target Date 02/27/24   SLP Goal 2   Goal Identifier AAC Safety   Goal Description Marcos will answer questions about himself (e.g., his name, age, phone number, and address) 4x per session when provided models and moderate therapeutic supports to improve functional communication skills for personal safety.   Rationale To maximize the ability to communicate wants and needs within the home or community;To maximize functional communication within the home or community   Goal Progress 11/22 Using pictures on Ipad, able to answer yes/no Q's in 2/5 opps. given mod-maxA 11/17 3x able to navigate to family page and answered Q's about his family members 10/20: with moderate cues he answered \"what is your name?\" by activating the correct  button 2x   Target Date 02/27/25   SLP Goal 3   Goal Identifier AAC - high frequency   Goal Description Marcos will select high frequency words and phrases (e.g., buttons for most-requested items, 'I need help', etc.) 5x per session when provided moderate therapeutic supports to improve functional communication skills on his AAC device.   Rationale To maximize functional communication within the home or community;To maximize the ability to communicate wants and needs within the home or community   Goal Progress 1/19 20x able to use AAC to request colors for balls 12/22 Given direct model paired with hand sign, able to produce verbal approximations 10x this date 12/1 0x able to follow imitation of direct models (spoken and AAC) to imitate \"I want\" statements 11/24 2x req. modA  to request continuation using AAC10/20: with models and visual prompts Pt used the words \"on,\" \"in,\" \"more,\" \"go,\" and \"ball\" 3x each in play based activities. He independently created the phrases \"I want more,\" \"I want gym\" and with visual cues \"I want ball\" and \"I like playing\"   Target Date 12/12/23   Treatment Interventions " (SLP)   Treatment Interventions Treatment Speech/Lang/Voice;Training-Speech Device   Treatment Speech/Lang/Voice   Treatment of Speech, Language, Voice Communication&/or Auditory Processing (34398) 30 Minutes   Speech/Lang/Voice 1 language   Speech/Lang/Voice 1 - Details Trialed visual group plan to aid in participation and engagement; enjoyed gross motor gym, basketball, and balls -- some stuck thinking throughout, but once engaged enjoyed collaborating and conversing with SLP; indirectly targeted verbal imitations and simple direction following; also targeted functional response to yes/no Q's (e.g. do you want a straight piece next?); had most fun when SLP would be silly and exaggerated   Skilled Intervention Provided written and verbal information on.;Modeled compensatory strategies;Provided feedback on performance of tasks;Facilitated respiratory, laryngeal, oral integration   Patient Response/Progress Benefited from client-led, pref. act. this date   Treatment Detail See goals above for objective measures. Visual supports were implemented to increase understanding and use of communicative intent for requests, labeling, answering/asking questions. Total language approach to therapy taken. Shared reading, anticipatory sets, and/or early developing sounds were modeled and facilitated with expectant pausing and play scheme to elicit verbal and/or gestural. Therapist provided auditory/visual bombardment, expectant delays, wait time exaggerated models, repetitions, positive reinforcement, scaffolding of cues, and play scheme to facilitate language opportunities throughout the session. Home-programming and education was provided to his caregiver to promote speech and language opportunities and practice.   Progress Fair progress with stated goals.   Training-Speech Device   Training with Non-Speech Generating Device Minutes (26470) 15   Training Speech Device 1 AAC   Training Speech Device 1 - Details Made accessible  to Malek throughout; targeted new skills using bubbles page via topics category; modeled, narrated, demonstrated basic language models -- some spon. use to express wants, needs, ideas to SLP and caregiver   Skilled Intervention Provided written and visual demonstrated; provided parent coaching; skilled instruction re: AAC use   Patient Response/Progress Good for stated goals   Treatment Detail See above   Education   Learner/Method Caregiver;Family;Patient   Education Comments encouraged family to continue modeling phrases and using words that were targeted in therapy   Plan   Home program Use AAC to make choices   Updates to plan of care Continue POC   Plan for next session Sorting & making choices   Total Session Time   Total Treatment Time (sum of timed and untimed services) 45   PEDS Speech/Lang Goal 1   Goal Identifier STG 7 - Speech   Goal Description Marcos will imitate early developing speech sounds (/m, p, b , h, w, n, t, d, k, g, f/) in CV, VC, and CVC syllables in 80% trials when provided with direct models and moderate cues to improve intelligibility.   Goal Progress open, more, blue, top aud. bombardment during play ~50% accurac of inclusion of consonants. THerapist provided IM for all trials and segmented words with errors.   Target Date 06/17/23   PEDS Speech/Lang Goal 2   Goal Identifier STG 8 - AAC - Safety   Goal Description Marcos will answer questions about himself (e.g., his name, age, phone number, and address) 4x per session when provided models and moderate therapeutic supports to improve functional communication skills for personal safety.   Goal Progress dnt   Target Date 06/17/23   PEDS Speech/Lang Goal 3   Goal Identifier STG 9 - AAC   Goal Description Marcos will select high frequency words and phrases (e.g., buttons for most-requested items, 'I need help', etc.) 5x per session when provided moderate therapeutic supports to improve functional communication skills on his AAC  device.   Goal Progress targeted combining words with device - I need help was today's focus. SLP provided IM x3 and then scaffolded down to gestural cuing for remainder of session.   Target Date 06/17/23   PEDS Speech/Lang Goal 4   Goal Identifier STG 10 - Parental Education   Goal Description Marcos's caregivers will increase their operational competence in using Snap Core on the iPad by demonstrating 5 operational functions (e.g. navigating pages, modifying a button, selecting grid size, etc.) to facilitate his effective and efficient use of the device.   Goal Progress Indicated understanding of strategies modeled and discussed   Target Date 06/17/23   Pediatric Speech/Language Goals   PEDS Speech/Language Goals 1;2;3;4;5   Clinical Impression   SLP Diagnosis severe expressive language deficits;severe receptive language deficits  (Severe speech sound disorder)      PERRL/EOMI/conjunctiva clear/normal

## 2024-05-20 NOTE — H&P ADULT - HISTORY OF PRESENT ILLNESS
Cards: Dr. SPENCER Olvera  Pharmacy:  Escort:    64M with hx of HTN, HLD, CAD s/p ALLI to anomalous RCA (10/26/23), FLORENTINO (LICA 50-79% per MD note), and PVD, who initially presented to his outpatient cardiologist Dr. Olvera c/o intermittent, non-radiating Lt-sided CP -- worse with exertion (max 1-2 flights of stairs) with associated palpitations and dizziness. Pt underwent Stress Test (3/25/24) which was negative for ischemia and LHC (10/26/23): ALLI to anomalous RCA from left cusp (80%) -- via RRA; Other findings: LM normal, mLAD 30%, ostial D1 60-70%, LCx / OM1 / RPDA mild diffuse disease -- via RRA [IC: Dr. SPENCER Olvera / Fellow: Alonzo Nino]    Currently, ___ pt reports DAPT compliance. Pt denies any fatigue, weakness, headache, dizziness/lightheadedness, CP, palpitations, SOB, PROCTOR, orthopnea/PND, LE edema, N/V, abdominal discomfort, melena, BRBPR, hematemesis, hematuria, or recent sick contacts/travel.    In light of pt's risk factors and CCS Class ___ anginal symptoms, pt is referred to [facility] for cardiac catheterization and revascularization if clinically indicated.     Cards: Dr. SPENCER Olvera  Pharmacy: [facility] ([address] [place] [address])  Escort: Wife (Marianne Joyce)    64M with hx of HTN, HLD, CAD s/p ALLI to anomalous RCA (10/26/23), FLORENTINO (LICA 50-79% per MD note), and PVD, who initially presented to his outpatient cardiologist Dr. Olvera c/o intermittent, non-radiating Lt-sided CP -- worse with exertion (max 1-2 flights of stairs) with associated palpitations and dizziness. Pt underwent Stress Test (3/25/24) which was negative for ischemia and LHC (10/26/23): ALLI to anomalous RCA from left cusp (80%) -- via RRA; Other findings: LM normal, mLAD 30%, ostial D1 60-70%, LCx / OM1 / RPDA mild diffuse disease -- via RRA [IC: Dr. SPENCER Olvera / Fellow: Alonzo Nino]    Currently, pt reports DAPT compliance, last dose of ASA 81 mg PO qd and Plavix 75 mg PO qd today AM. Pt denies any fatigue, weakness, headache, dizziness/lightheadedness, CP, palpitations, SOB, PROCTOR, orthopnea/PND, LE edema, N/V, abdominal discomfort, melena, BRBPR, hematemesis, hematuria, or recent sick contacts/travel.    In light of pt's risk factors and CCS Class III anginal symptoms, pt is referred to [facility] for cardiac catheterization and revascularization if clinically indicated.     Cards: Dr. SPENCER Olvera  Pharmacy: [facility] ([address] [place] [address])  Escort: Wife (Marianne Joyce)    64M with hx of HTN, HLD, CAD s/p ALLI to anomalous RCA (10/26/23), FLORENTINO (LICA 50-79% per MD note), pre-DM, and PVD, who initially presented to his outpatient cardiologist Dr. Olvera c/o intermittent, non-radiating Lt-sided CP -- worse with exertion (max 1-2 flights of stairs) with associated palpitations and dizziness. Currently, pt reports DAPT compliance, last dose of ASA 81 mg PO qd and Plavix 75 mg PO qd today AM. Pt denies any fatigue, weakness, headache, dizziness/lightheadedness, CP, palpitations, SOB, PROCTOR, orthopnea/PND, LE edema, N/V, abdominal discomfort, melena, BRBPR, hematemesis, hematuria, or URI symptoms.     Past Testing:  - Stress Test (3/25/24): negative for ischemia  - [facility] (10/26/23): ALLI to anomalous RCA from left cusp (80%) -- via RRA; Other findings: LM normal, mLAD 30%, ostial D1 60-70%, LCx / OM1 / RPDA mild diffuse disease -- via RRA [IC: Dr. SPENCER Olvera / Fellow: Alonzo Nino]  - As per discharge note (10/26/23): EF 60-65% by ECHO    In light of pt's risk factors and CCS Class III anginal symptoms, pt is referred to [facility] for cardiac catheterization and revascularization if clinically indicated.

## 2024-05-23 ENCOUNTER — OUTPATIENT (OUTPATIENT)
Dept: OUTPATIENT SERVICES | Facility: HOSPITAL | Age: 64
LOS: 1 days | Discharge: HOME | End: 2024-05-23
Payer: COMMERCIAL

## 2024-05-23 LAB
A1C WITH ESTIMATED AVERAGE GLUCOSE RESULT: 6 % — HIGH (ref 4–5.6)
ALBUMIN SERPL ELPH-MCNC: 4.1 G/DL — SIGNIFICANT CHANGE UP (ref 3.3–5)
ALP SERPL-CCNC: 89 U/L — SIGNIFICANT CHANGE UP (ref 40–120)
ALT FLD-CCNC: 20 U/L — SIGNIFICANT CHANGE UP (ref 10–45)
ANION GAP SERPL CALC-SCNC: 9 MMOL/L — SIGNIFICANT CHANGE UP (ref 5–17)
APTT BLD: 36.9 SEC — HIGH (ref 24.5–35.6)
AST SERPL-CCNC: 18 U/L — SIGNIFICANT CHANGE UP (ref 10–40)
BASOPHILS # BLD AUTO: 0.05 K/UL — SIGNIFICANT CHANGE UP (ref 0–0.2)
BASOPHILS NFR BLD AUTO: 1.1 % — SIGNIFICANT CHANGE UP (ref 0–2)
BILIRUB SERPL-MCNC: 0.3 MG/DL — SIGNIFICANT CHANGE UP (ref 0.2–1.2)
BUN SERPL-MCNC: 14 MG/DL — SIGNIFICANT CHANGE UP (ref 7–23)
CALCIUM SERPL-MCNC: 9.1 MG/DL — SIGNIFICANT CHANGE UP (ref 8.4–10.5)
CHLORIDE SERPL-SCNC: 106 MMOL/L — SIGNIFICANT CHANGE UP (ref 96–108)
CHOLEST SERPL-MCNC: 149 MG/DL — SIGNIFICANT CHANGE UP
CK MB CFR SERPL CALC: 2.9 NG/ML — SIGNIFICANT CHANGE UP (ref 0–6.7)
CK SERPL-CCNC: 131 U/L — SIGNIFICANT CHANGE UP (ref 30–200)
CO2 SERPL-SCNC: 26 MMOL/L — SIGNIFICANT CHANGE UP (ref 22–31)
CREAT SERPL-MCNC: 1.14 MG/DL — SIGNIFICANT CHANGE UP (ref 0.5–1.3)
EGFR: 73 ML/MIN/1.73M2 — SIGNIFICANT CHANGE UP
EOSINOPHIL # BLD AUTO: 0.1 K/UL — SIGNIFICANT CHANGE UP (ref 0–0.5)
EOSINOPHIL NFR BLD AUTO: 2.1 % — SIGNIFICANT CHANGE UP (ref 0–6)
ESTIMATED AVERAGE GLUCOSE: 126 MG/DL — HIGH (ref 68–114)
GLUCOSE SERPL-MCNC: 104 MG/DL — HIGH (ref 70–99)
HCT VFR BLD CALC: 39.7 % — SIGNIFICANT CHANGE UP (ref 39–50)
HDLC SERPL-MCNC: 33 MG/DL — LOW
HGB BLD-MCNC: 13.5 G/DL — SIGNIFICANT CHANGE UP (ref 13–17)
IMM GRANULOCYTES NFR BLD AUTO: 0.2 % — SIGNIFICANT CHANGE UP (ref 0–0.9)
INR BLD: 1.09 — SIGNIFICANT CHANGE UP (ref 0.85–1.18)
LIPID PNL WITH DIRECT LDL SERPL: 99 MG/DL — SIGNIFICANT CHANGE UP
LYMPHOCYTES # BLD AUTO: 2.19 K/UL — SIGNIFICANT CHANGE UP (ref 1–3.3)
LYMPHOCYTES # BLD AUTO: 46.7 % — HIGH (ref 13–44)
MAGNESIUM SERPL-MCNC: 2.1 MG/DL — SIGNIFICANT CHANGE UP (ref 1.6–2.6)
MCHC RBC-ENTMCNC: 31.8 PG — SIGNIFICANT CHANGE UP (ref 27–34)
MCHC RBC-ENTMCNC: 34 GM/DL — SIGNIFICANT CHANGE UP (ref 32–36)
MCV RBC AUTO: 93.6 FL — SIGNIFICANT CHANGE UP (ref 80–100)
MONOCYTES # BLD AUTO: 0.44 K/UL — SIGNIFICANT CHANGE UP (ref 0–0.9)
MONOCYTES NFR BLD AUTO: 9.4 % — SIGNIFICANT CHANGE UP (ref 2–14)
NEUTROPHILS # BLD AUTO: 1.9 K/UL — SIGNIFICANT CHANGE UP (ref 1.8–7.4)
NEUTROPHILS NFR BLD AUTO: 40.5 % — LOW (ref 43–77)
NON HDL CHOLESTEROL: 116 MG/DL — SIGNIFICANT CHANGE UP
NRBC # BLD: 0 /100 WBCS — SIGNIFICANT CHANGE UP (ref 0–0)
PLATELET # BLD AUTO: 202 K/UL — SIGNIFICANT CHANGE UP (ref 150–400)
POTASSIUM SERPL-MCNC: 4.5 MMOL/L — SIGNIFICANT CHANGE UP (ref 3.5–5.3)
POTASSIUM SERPL-SCNC: 4.5 MMOL/L — SIGNIFICANT CHANGE UP (ref 3.5–5.3)
PROT SERPL-MCNC: 6.4 G/DL — SIGNIFICANT CHANGE UP (ref 6–8.3)
PROTHROM AB SERPL-ACNC: 12.4 SEC — SIGNIFICANT CHANGE UP (ref 9.5–13)
RBC # BLD: 4.24 M/UL — SIGNIFICANT CHANGE UP (ref 4.2–5.8)
RBC # FLD: 12.8 % — SIGNIFICANT CHANGE UP (ref 10.3–14.5)
SODIUM SERPL-SCNC: 141 MMOL/L — SIGNIFICANT CHANGE UP (ref 135–145)
TRIGL SERPL-MCNC: 91 MG/DL — SIGNIFICANT CHANGE UP
WBC # BLD: 4.69 K/UL — SIGNIFICANT CHANGE UP (ref 3.8–10.5)
WBC # FLD AUTO: 4.69 K/UL — SIGNIFICANT CHANGE UP (ref 3.8–10.5)

## 2024-05-23 PROCEDURE — 80053 COMPREHEN METABOLIC PANEL: CPT

## 2024-05-23 PROCEDURE — C1887: CPT

## 2024-05-23 PROCEDURE — 99152 MOD SED SAME PHYS/QHP 5/>YRS: CPT

## 2024-05-23 PROCEDURE — 80061 LIPID PANEL: CPT

## 2024-05-23 PROCEDURE — 36415 COLL VENOUS BLD VENIPUNCTURE: CPT

## 2024-05-23 PROCEDURE — C1769: CPT

## 2024-05-23 PROCEDURE — 82550 ASSAY OF CK (CPK): CPT

## 2024-05-23 PROCEDURE — C1894: CPT

## 2024-05-23 PROCEDURE — 83735 ASSAY OF MAGNESIUM: CPT

## 2024-05-23 PROCEDURE — 93458 L HRT ARTERY/VENTRICLE ANGIO: CPT | Mod: 26

## 2024-05-23 PROCEDURE — 85730 THROMBOPLASTIN TIME PARTIAL: CPT

## 2024-05-23 PROCEDURE — 85610 PROTHROMBIN TIME: CPT

## 2024-05-23 PROCEDURE — 82553 CREATINE MB FRACTION: CPT

## 2024-05-23 PROCEDURE — 83036 HEMOGLOBIN GLYCOSYLATED A1C: CPT

## 2024-05-23 PROCEDURE — 93458 L HRT ARTERY/VENTRICLE ANGIO: CPT

## 2024-05-23 PROCEDURE — 85025 COMPLETE CBC W/AUTO DIFF WBC: CPT

## 2024-05-23 PROCEDURE — C1760: CPT

## 2024-05-23 RX ORDER — METOPROLOL TARTRATE 50 MG
1 TABLET ORAL
Refills: 0 | DISCHARGE

## 2024-05-23 RX ORDER — HYDROCHLOROTHIAZIDE 25 MG
1 TABLET ORAL
Refills: 0 | DISCHARGE

## 2024-05-23 RX ORDER — SODIUM CHLORIDE 9 MG/ML
250 INJECTION INTRAMUSCULAR; INTRAVENOUS; SUBCUTANEOUS ONCE
Refills: 0 | Status: COMPLETED | OUTPATIENT
Start: 2024-05-23 | End: 2024-05-23

## 2024-05-23 RX ORDER — ASPIRIN/CALCIUM CARB/MAGNESIUM 324 MG
81 TABLET ORAL DAILY
Refills: 0 | Status: DISCONTINUED | OUTPATIENT
Start: 2024-05-23 | End: 2024-05-23

## 2024-05-23 RX ORDER — SODIUM CHLORIDE 9 MG/ML
500 INJECTION INTRAMUSCULAR; INTRAVENOUS; SUBCUTANEOUS
Refills: 0 | Status: DISCONTINUED | OUTPATIENT
Start: 2024-05-23 | End: 2024-06-07

## 2024-05-23 RX ORDER — CLOPIDOGREL BISULFATE 75 MG/1
75 TABLET, FILM COATED ORAL DAILY
Refills: 0 | Status: DISCONTINUED | OUTPATIENT
Start: 2024-05-23 | End: 2024-05-23

## 2024-05-23 RX ORDER — SODIUM CHLORIDE 9 MG/ML
1000 INJECTION INTRAMUSCULAR; INTRAVENOUS; SUBCUTANEOUS
Refills: 0 | Status: DISCONTINUED | OUTPATIENT
Start: 2024-05-23 | End: 2024-06-07

## 2024-05-23 RX ADMIN — SODIUM CHLORIDE 75 MILLILITER(S): 9 INJECTION INTRAMUSCULAR; INTRAVENOUS; SUBCUTANEOUS at 14:31

## 2024-05-23 RX ADMIN — SODIUM CHLORIDE 500 MILLILITER(S): 9 INJECTION INTRAMUSCULAR; INTRAVENOUS; SUBCUTANEOUS at 14:32

## 2024-05-23 NOTE — PROGRESS NOTE ADULT - SUBJECTIVE AND OBJECTIVE BOX
Interventional Cardiology PA SDA Discharge Note    Patient without complaints. Ambulated and voided without difficulties    Afebrile, VSS    Ext:    Right Groin: No hematoma, no bruit, dressing; C/D/I    Pulses:    intact DP/PT to baseline     A/P:  64M with hx of HTN, HLD, CAD s/p ALLI to anomalous RCA (10/26/23), FLORENTINO (LICA 50-79% per MD note), pre-DM, and PVD, who initially presented to his outpatient cardiologist Dr. Olvera c/o intermittent, non-radiating Lt-sided CP -- worse with exertion (max 1-2 flights of stairs) with associated palpitations and dizziness. Currently, pt reports DAPT compliance, last dose of ASA 81 mg PO qd and Plavix 75 mg PO qd today AM. Pt denies any fatigue, weakness, headache, dizziness/lightheadedness, CP, palpitations, SOB, PROCTOR, orthopnea/PND, LE edema, N/V, abdominal discomfort, melena, BRBPR, hematemesis, hematuria, or URI symptoms.     Patient is now s/p cardiac cath 05/23/24: LM: normal, mLAD: 30%, mild distal disease, LCx: mild diffuse disease, RCA: mild disease, dominent, pRCA: patent stent, EDP: 12 mmHg. EF normal as per echo. R groin PC.       1.	Stable for discharge as per attending Dr. Olvera, after bed rest, pt voids, groin stable and 30 minutes of ambulation.  2.	Follow-up with Cardiologist, Dr. Olvera in 1-2 weeks  3.	Discharged forms signed and copies in chart

## 2024-05-29 DIAGNOSIS — I25.110 ATHEROSCLEROTIC HEART DISEASE OF NATIVE CORONARY ARTERY WITH UNSTABLE ANGINA PECTORIS: ICD-10-CM

## 2024-05-29 DIAGNOSIS — Z95.5 PRESENCE OF CORONARY ANGIOPLASTY IMPLANT AND GRAFT: ICD-10-CM

## 2024-07-03 NOTE — DISCHARGE NOTE PROVIDER - CARE PROVIDER_API CALL
From: Sintia Champion  To: Dr. Renae Méndez  Sent: 7/3/2024 9:00 AM EDT  Subject: Nausea     Good morning Doctor K. Could you please prescribe me some pills for this darn nausea I have been having since I started the 300mg of Wellbutrin and those shots…. I tried to take over the counter stuff buts it’s not helping. I sure hope all these symptoms go away.     Also since I started the 300mg of WB I have been having uncontrollable ticks (movements) if this is one of the side effects when will it be gone it’s so annoying, it’s mostly in my right arm, back and Restorationism same side… crazy how all this is happening I am exhausted from twitching like I am going to take off anytime lol. Please help! Have a great weekend and I sure hope you get some well deserved rest.    José Clements  Interventional Cardiology  [address] [place] [address], [place] [address]  Phone: [phone]  Fax: [phone]  Follow Up Time: 2 weeks